# Patient Record
Sex: FEMALE | Race: WHITE | NOT HISPANIC OR LATINO
[De-identification: names, ages, dates, MRNs, and addresses within clinical notes are randomized per-mention and may not be internally consistent; named-entity substitution may affect disease eponyms.]

---

## 2022-06-01 ENCOUNTER — P2P PATIENT RECORD (OUTPATIENT)
Age: 42
End: 2022-06-01

## 2022-06-07 ENCOUNTER — LAB OUTSIDE AN ENCOUNTER (OUTPATIENT)
Dept: URBAN - METROPOLITAN AREA CLINIC 54 | Facility: CLINIC | Age: 42
End: 2022-06-07

## 2022-06-07 ENCOUNTER — OFFICE VISIT (OUTPATIENT)
Dept: URBAN - METROPOLITAN AREA CLINIC 54 | Facility: CLINIC | Age: 42
End: 2022-06-07
Payer: COMMERCIAL

## 2022-06-07 ENCOUNTER — WEB ENCOUNTER (OUTPATIENT)
Dept: URBAN - METROPOLITAN AREA CLINIC 54 | Facility: CLINIC | Age: 42
End: 2022-06-07

## 2022-06-07 VITALS
TEMPERATURE: 98 F | HEART RATE: 82 BPM | SYSTOLIC BLOOD PRESSURE: 126 MMHG | WEIGHT: 170 LBS | RESPIRATION RATE: 15 BRPM | HEIGHT: 64 IN | BODY MASS INDEX: 29.02 KG/M2 | DIASTOLIC BLOOD PRESSURE: 77 MMHG

## 2022-06-07 DIAGNOSIS — K52.831 COLLAGENOUS COLITIS: ICD-10-CM

## 2022-06-07 DIAGNOSIS — R10.30 LOWER ABDOMINAL PAIN: ICD-10-CM

## 2022-06-07 DIAGNOSIS — K52.89 (LYMPHOCYTIC) MICROSCOPIC COLITIS: ICD-10-CM

## 2022-06-07 PROCEDURE — 99204 OFFICE O/P NEW MOD 45 MIN: CPT | Performed by: INTERNAL MEDICINE

## 2022-06-07 PROCEDURE — 99244 OFF/OP CNSLTJ NEW/EST MOD 40: CPT | Performed by: INTERNAL MEDICINE

## 2022-06-07 RX ORDER — ESCITALOPRAM 20 MG/1
1 TABLET TABLET, FILM COATED ORAL ONCE A DAY
COMMUNITY

## 2022-06-07 RX ORDER — SODIUM PICOSULFATE, MAGNESIUM OXIDE, AND ANHYDROUS CITRIC ACID 10; 3.5; 12 MG/160ML; G/160ML; G/160ML
TAKE 320 ML LIQUID ORAL AS DIRECTED
Qty: 320 ML | Refills: 0 | OUTPATIENT
Start: 2022-06-07 | End: 2022-06-08

## 2022-06-07 RX ORDER — DICYCLOMINE HYDROCHLORIDE 20 MG/1
1 TABLET TABLET ORAL THREE TIMES A DAY
Qty: 90 | OUTPATIENT
Start: 2022-06-07 | End: 2022-07-07

## 2022-06-07 RX ORDER — HYDROCHLOROTHIAZIDE 12.5 MG/1
1 CAPSULE IN THE MORNING CAPSULE ORAL ONCE A DAY
COMMUNITY

## 2022-06-07 RX ORDER — AMLODIPINE BESYLATE 5 MG/1
1 TABLET TABLET ORAL ONCE A DAY
COMMUNITY

## 2022-06-07 NOTE — HPI-TODAY'S VISIT:
6/7/22: Patient was referred by ISAAC Archuleta for diarrhea. A copy of this document will be sent to the physician. Pt is a 42 yo female who complains of chronic diarrhea for 2-3 months. Pt complains of 10-12 loose BMs daily. Taking imodium with some improvement. Reports associated lower abd cramping that improves with defectation. Pt has had two episodes of urge incontinence with leakage of stool. Denies rectal bleeding, melena, mucus in stool, greasy stools.  Denies n/v, anorexia, fever, or unintentional weight loss. Denies recent abx use, travel outside the [place], sick contacts, or dietary changes. Pt started testosterone injections 2 months ago. Recent labs from 5/29 include an unremarkable CBC and CMP, negative Ttg-IgA and h pylori. O&P, C diff, and stool cultures submitted but unavailable for review. No recent imaging. On review of last cscope from 2012, pt's biopsies were significant for microscopic colitis.  Colonoscopy 5/2012: - Normal terminal ileum s/p biopsy - Normal colonoscopy s/p biopsy Biopsy: Active chronic ileitis. Active chronic colitis consistent with microscopic (collagenous) colitis. Negative for IBD.

## 2022-06-08 LAB
C-REACTIVE PROTEIN, QUANT: 3
TSH: 2.44

## 2022-06-20 LAB
C-REACTIVE PROTEIN, QUANT: (no result)
CALPROTECTIN, FECAL: <16
PANCREATIC ELASTASE, FECAL: 243
REQUEST PROBLEM: (no result)
TSH: (no result)

## 2022-06-21 ENCOUNTER — OFFICE VISIT (OUTPATIENT)
Dept: URBAN - METROPOLITAN AREA SURGERY CENTER 14 | Facility: SURGERY CENTER | Age: 42
End: 2022-06-21
Payer: COMMERCIAL

## 2022-06-21 DIAGNOSIS — K52.832 FOCAL LYMPHOCYTIC COLITIS: ICD-10-CM

## 2022-06-21 DIAGNOSIS — D12.2 ADENOMA OF ASCENDING COLON: ICD-10-CM

## 2022-06-21 DIAGNOSIS — K52.9 CHRONIC DIARRHEA: ICD-10-CM

## 2022-06-21 PROCEDURE — G8907 PT DOC NO EVENTS ON DISCHARG: HCPCS | Performed by: INTERNAL MEDICINE

## 2022-06-21 PROCEDURE — 45380 COLONOSCOPY AND BIOPSY: CPT | Performed by: INTERNAL MEDICINE

## 2022-06-30 ENCOUNTER — ERX REFILL RESPONSE (OUTPATIENT)
Dept: URBAN - METROPOLITAN AREA CLINIC 54 | Facility: CLINIC | Age: 42
End: 2022-06-30

## 2022-06-30 RX ORDER — DICYCLOMINE HYDROCHLORIDE 20 MG/1
TAKE 1 TABLET BY MOUTH THREE TIMES A DAY FOR 30 DAYS TABLET ORAL
Qty: 90 TABLET | Refills: 0 | OUTPATIENT

## 2022-06-30 RX ORDER — DICYCLOMINE HYDROCHLORIDE 20 MG/1
1 TABLET TABLET ORAL THREE TIMES A DAY
Qty: 90 | OUTPATIENT

## 2022-07-05 ENCOUNTER — TELEPHONE ENCOUNTER (OUTPATIENT)
Dept: URBAN - METROPOLITAN AREA CLINIC 92 | Facility: CLINIC | Age: 42
End: 2022-07-05

## 2022-07-05 RX ORDER — DICYCLOMINE HYDROCHLORIDE 20 MG/1
TAKE 1 TABLET BY MOUTH THREE TIMES A DAY FOR 30 DAYS TABLET ORAL
OUTPATIENT

## 2022-07-05 RX ORDER — BUDESONIDE 3 MG/1
3 CAPSULES CAPSULE, COATED PELLETS ORAL ONCE A DAY
Qty: 90 CAPSULE | Refills: 2 | OUTPATIENT
Start: 2022-07-05

## 2022-07-25 ENCOUNTER — ERX REFILL RESPONSE (OUTPATIENT)
Dept: URBAN - METROPOLITAN AREA CLINIC 54 | Facility: CLINIC | Age: 42
End: 2022-07-25

## 2022-07-25 RX ORDER — DICYCLOMINE HYDROCHLORIDE 20 MG/1
TAKE 1 TABLET BY MOUTH THREE TIMES A DAY TABLET ORAL
Qty: 270 TABLET | Refills: 1 | OUTPATIENT

## 2022-08-04 ENCOUNTER — TELEPHONE ENCOUNTER (OUTPATIENT)
Dept: URBAN - METROPOLITAN AREA CLINIC 54 | Facility: CLINIC | Age: 42
End: 2022-08-04

## 2022-08-04 RX ORDER — BUDESONIDE 3 MG/1
3 CAPSULES CAPSULE, COATED PELLETS ORAL ONCE A DAY
Qty: 90 CAPSULE | Refills: 2
Start: 2022-07-05

## 2022-08-19 ENCOUNTER — DASHBOARD ENCOUNTERS (OUTPATIENT)
Age: 42
End: 2022-08-19

## 2022-08-19 ENCOUNTER — OFFICE VISIT (OUTPATIENT)
Dept: URBAN - METROPOLITAN AREA CLINIC 54 | Facility: CLINIC | Age: 42
End: 2022-08-19
Payer: COMMERCIAL

## 2022-08-19 VITALS
WEIGHT: 171 LBS | TEMPERATURE: 97.9 F | DIASTOLIC BLOOD PRESSURE: 76 MMHG | HEIGHT: 64 IN | SYSTOLIC BLOOD PRESSURE: 123 MMHG | BODY MASS INDEX: 29.19 KG/M2 | HEART RATE: 76 BPM

## 2022-08-19 DIAGNOSIS — K52.9 CHRONIC DIARRHEA: ICD-10-CM

## 2022-08-19 DIAGNOSIS — R10.30 LOWER ABDOMINAL PAIN: ICD-10-CM

## 2022-08-19 DIAGNOSIS — K52.831 COLLAGENOUS COLITIS: ICD-10-CM

## 2022-08-19 PROBLEM — 19311003: Status: ACTIVE | Noted: 2022-06-07

## 2022-08-19 PROCEDURE — 99213 OFFICE O/P EST LOW 20 MIN: CPT | Performed by: INTERNAL MEDICINE

## 2022-08-19 RX ORDER — DICYCLOMINE HYDROCHLORIDE 20 MG/1
TAKE 1 TABLET BY MOUTH THREE TIMES A DAY TABLET ORAL
Qty: 270 TABLET | Refills: 1 | Status: ACTIVE | COMMUNITY

## 2022-08-19 RX ORDER — HYDROCHLOROTHIAZIDE 12.5 MG/1
1 CAPSULE IN THE MORNING CAPSULE ORAL ONCE A DAY
Status: ACTIVE | COMMUNITY

## 2022-08-19 RX ORDER — ESCITALOPRAM 20 MG/1
1 TABLET TABLET, FILM COATED ORAL ONCE A DAY
Status: ACTIVE | COMMUNITY

## 2022-08-19 RX ORDER — AMLODIPINE BESYLATE 5 MG/1
1 TABLET TABLET ORAL ONCE A DAY
Status: ACTIVE | COMMUNITY

## 2022-08-19 RX ORDER — BUDESONIDE 3 MG/1
3 CAPSULES CAPSULE, COATED PELLETS ORAL ONCE A DAY
Qty: 90 CAPSULE | Refills: 2 | Status: ACTIVE | COMMUNITY
Start: 2022-07-05

## 2022-08-19 NOTE — HPI-TODAY'S VISIT:
6/7/22: Patient was referred by ISAAC Archuleta for diarrhea. A copy of this document will be sent to the physician. Pt is a 40 yo female who complains of chronic diarrhea for 2-3 months. Pt complains of 10-12 loose BMs daily. Taking imodium with some improvement. Reports associated lower abd cramping that improves with defectation. Pt has had two episodes of urge incontinence with leakage of stool. Denies rectal bleeding, melena, mucus in stool, greasy stools.  Denies n/v, anorexia, fever, or unintentional weight loss. Denies recent abx use, travel outside the [place], sick contacts, or dietary changes. Pt started testosterone injections 2 months ago. Recent labs from 5/29 include an unremarkable CBC and CMP, negative Ttg-IgA and h pylori. O&P, C diff, and stool cultures submitted but unavailable for review. No recent imaging. On review of last cscope from 2012, pt's biopsies were significant for microscopic colitis.  Colonoscopy 5/2012: - Normal terminal ileum s/p biopsy - Normal colonoscopy s/p biopsy Biopsy: Active chronic ileitis. Active chronic colitis consistent with microscopic (collagenous) colitis. Negative for IBD.  Follow Up 8/19/22: Patient presents for routine follow up. She was diagnosed with microscopic colitis in June 2022. She reports good response to Budesonide 9 mg po daily. No new complaints. Also had a small 5 mm TA removed. 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 Willis. She has been off immunosuppressants for 3 years.